# Patient Record
Sex: MALE | Race: BLACK OR AFRICAN AMERICAN | NOT HISPANIC OR LATINO | URBAN - METROPOLITAN AREA
[De-identification: names, ages, dates, MRNs, and addresses within clinical notes are randomized per-mention and may not be internally consistent; named-entity substitution may affect disease eponyms.]

---

## 2019-01-18 ENCOUNTER — EMERGENCY (EMERGENCY)
Facility: HOSPITAL | Age: 64
LOS: 1 days | Discharge: HOME | End: 2019-01-18
Admitting: EMERGENCY MEDICINE
Payer: MEDICAID

## 2019-01-18 VITALS
RESPIRATION RATE: 18 BRPM | SYSTOLIC BLOOD PRESSURE: 125 MMHG | TEMPERATURE: 97 F | DIASTOLIC BLOOD PRESSURE: 77 MMHG | OXYGEN SATURATION: 99 % | HEART RATE: 80 BPM

## 2019-01-18 VITALS
DIASTOLIC BLOOD PRESSURE: 85 MMHG | SYSTOLIC BLOOD PRESSURE: 144 MMHG | HEART RATE: 95 BPM | OXYGEN SATURATION: 100 % | RESPIRATION RATE: 16 BRPM | TEMPERATURE: 99 F

## 2019-01-18 LAB
APPEARANCE UR: CLEAR — SIGNIFICANT CHANGE UP
BILIRUB UR-MCNC: NEGATIVE — SIGNIFICANT CHANGE UP
BLOOD UR QL VISUAL: NEGATIVE — SIGNIFICANT CHANGE UP
COLOR SPEC: YELLOW — SIGNIFICANT CHANGE UP
GLUCOSE UR-MCNC: NEGATIVE — SIGNIFICANT CHANGE UP
KETONES UR-MCNC: NEGATIVE — SIGNIFICANT CHANGE UP
LEUKOCYTE ESTERASE UR-ACNC: NEGATIVE — SIGNIFICANT CHANGE UP
NITRITE UR-MCNC: NEGATIVE — SIGNIFICANT CHANGE UP
PH UR: 7.5 — SIGNIFICANT CHANGE UP (ref 5–8)
PROT UR-MCNC: 10 — SIGNIFICANT CHANGE UP
SP GR SPEC: 1.02 — SIGNIFICANT CHANGE UP (ref 1–1.04)
UROBILINOGEN FLD QL: NORMAL — SIGNIFICANT CHANGE UP

## 2019-01-18 PROCEDURE — 73030 X-RAY EXAM OF SHOULDER: CPT | Mod: 26,RT

## 2019-01-18 PROCEDURE — 73020 X-RAY EXAM OF SHOULDER: CPT | Mod: 26,RT

## 2019-01-18 PROCEDURE — 99283 EMERGENCY DEPT VISIT LOW MDM: CPT

## 2019-01-18 RX ORDER — IBUPROFEN 200 MG
600 TABLET ORAL ONCE
Qty: 0 | Refills: 0 | Status: COMPLETED | OUTPATIENT
Start: 2019-01-18 | End: 2019-01-18

## 2019-01-18 RX ADMIN — Medication 600 MILLIGRAM(S): at 10:42

## 2019-01-18 NOTE — ED PROVIDER NOTE - PHYSICAL EXAMINATION
R shoulder: FROM, NVI, sensate intact, mild ttp to anterior aspect of right shoulder, no erythema/ecchymosis/or swelling.    NEURO: EOMi, PERRLA, visual fields intact, tongue midline, negative romberg, strength 5/5 UE and LE bilaterally, normal gait, facial expressions intact, point to point intact, rapid alternating movements intact, sensate intact to UE and LE bilaterally. NVI

## 2019-01-18 NOTE — ED PROVIDER NOTE - NSFOLLOWUPINSTRUCTIONS_ED_ALL_ED_FT
Keep arm in sling  Follow up with Dr. Castillo from orthopedics  Continue Bactrim as your doctor prescribed  Return to ER for new or worsening symptoms

## 2019-01-18 NOTE — ED ADULT NURSE NOTE - OBJECTIVE STATEMENT
Received pt into spot #12 in intake area. Pt ambulatory no noted distress. C/o right shoulder pain and dysuria. Rates pain 8/10 pain scale. Evaluated by MANUEL Dia. Pending UA/urine Cx collection. Pt verbalized understanding.

## 2019-01-18 NOTE — ED PROVIDER NOTE - MEDICAL DECISION MAKING DETAILS
62 yo M here for dysuria x 3 weeks. well appearing. VSS. PLAN: urine, reassess 64 yo M here for dysuria x 3 weeks, tx by PMD with bactrim started yesterday. also c/o shoulder pain since trip and fall, denies additional injury or complaints. well appearing. VSS. PLAN: urine, reassess

## 2019-01-18 NOTE — ED PROVIDER NOTE - PROGRESS NOTE DETAILS
MANUEL aguilera: humeral head fx on xr, d/w ortho PA and recc CT to eval better. consider surgery vs. outpt fu. MANUEL aguilera: d/w ortho, sling and outpatient fu. pt agrees with plan, continue bactrim.

## 2019-01-18 NOTE — ED ADULT TRIAGE NOTE - CHIEF COMPLAINT QUOTE
Pt states that he was seen in [facility] 3 weeks ago, pt states that he fell and injured his right arm and had burning with urination, pt states that he is still having pain and dysuria states that he cannot sleep.  PMH prostate cancer had radiation last year.  Pt also states that he was seen by his PMD yesterday for unclear reason.  Pt is poor historian

## 2019-01-18 NOTE — ED PROVIDER NOTE - OBJECTIVE STATEMENT
62 yo M here for dysuria x 3 weeks. pt reports over the past 3 weeks he noted burning with urination. went to PMD yesterday but "didn't do anything". No meds taken. denies fever chills cp sob weakness HA dizziness back pain abdominal pain hematuria penile discharge testicular pain. 62 yo M here for dysuria x 3 weeks. pt reports over the past 3 weeks he noted burning with urination. went to PMD yesterday and rx'ed bactrim, took two pills so far. No other meds taken. Reports 2-3 weeks ago tripped and fell onto right shoulder. no head injury or LOC. went to OSH but "nothing was done". reports pain still in shoulder. denies fever chills cp sob weakness HA dizziness back pain abdominal pain hematuria penile discharge testicular pain.

## 2019-01-18 NOTE — ED ADULT NURSE NOTE - CHIEF COMPLAINT QUOTE
Pt states that he was seen in [facility] 3 weeks ago, pt states that he fell and injured his right arm and had burning with urination, pt states that he is still having pain and dysuria states that he cannot sleep.  PMH prostate cancer had radiation last year.  Pt also states that he was seen by his PMD yesterday for unclear reason.  Pt is poor historian

## 2019-01-18 NOTE — ED PROVIDER NOTE - CARE PROVIDER_API CALL
Earl Castillo), Orthopaedic Surgery  [address] [place] [address]  Phone: [phone]  Fax: [phone]

## 2019-01-19 LAB
BACTERIA UR CULT: SIGNIFICANT CHANGE UP
SPECIMEN SOURCE: SIGNIFICANT CHANGE UP

## 2019-09-19 ENCOUNTER — EMERGENCY (EMERGENCY)
Facility: HOSPITAL | Age: 64
LOS: 1 days | Discharge: HOME | End: 2019-09-19
Attending: EMERGENCY MEDICINE | Admitting: EMERGENCY MEDICINE
Payer: MEDICAID

## 2019-09-19 VITALS
DIASTOLIC BLOOD PRESSURE: 87 MMHG | SYSTOLIC BLOOD PRESSURE: 143 MMHG | RESPIRATION RATE: 16 BRPM | TEMPERATURE: 98 F | OXYGEN SATURATION: 99 % | HEART RATE: 83 BPM

## 2019-09-19 LAB
APPEARANCE UR: CLEAR — SIGNIFICANT CHANGE UP
BILIRUB UR-MCNC: NEGATIVE — SIGNIFICANT CHANGE UP
BLOOD UR QL VISUAL: NEGATIVE — SIGNIFICANT CHANGE UP
COLOR SPEC: YELLOW — SIGNIFICANT CHANGE UP
GLUCOSE UR-MCNC: NEGATIVE — SIGNIFICANT CHANGE UP
KETONES UR-MCNC: NEGATIVE — SIGNIFICANT CHANGE UP
LEUKOCYTE ESTERASE UR-ACNC: NEGATIVE — SIGNIFICANT CHANGE UP
NITRITE UR-MCNC: NEGATIVE — SIGNIFICANT CHANGE UP
PH UR: 7.5 — SIGNIFICANT CHANGE UP (ref 5–8)
PROT UR-MCNC: NEGATIVE — SIGNIFICANT CHANGE UP
SP GR SPEC: 1.02 — SIGNIFICANT CHANGE UP (ref 1–1.04)
UROBILINOGEN FLD QL: NORMAL — SIGNIFICANT CHANGE UP

## 2019-09-19 PROCEDURE — 99283 EMERGENCY DEPT VISIT LOW MDM: CPT

## 2019-09-19 RX ORDER — MOXIFLOXACIN HYDROCHLORIDE TABLETS, 400 MG 400 MG/1
1 TABLET, FILM COATED ORAL
Qty: 28 | Refills: 0
Start: 2019-09-19 | End: 2019-10-02

## 2019-09-19 NOTE — ED PROVIDER NOTE - PHYSICAL EXAMINATION
GENERAL: no acute distress, non-toxic appearing  HEAD: normocephalic, atraumatic  HEENT: normal conjunctiva, oral mucosa moist, neck supple  CARDIAC: regular rate and rhythm, normal S1 and S2,  no appreciable murmurs  PULM: clear to ascultation bilaterally, no crackles, rales, rhonchi, or wheezing  GI: abdomen nondistended, soft, nontender, no guarding or rebound tenderness  : no CVA tenderness, no suprapubic tenderness, enlarged prostate, boggy to palpation  NEURO: alert and oriented x 3, normal speech, PERRLA, EOMI, no focal motor or sensory deficits,   MSK: no visible deformities, no peripheral edema, calf tenderness/redness/swelling  SKIN: no visible rashes, dry, well-perfused  PSYCH: appropriate mood and affect

## 2019-09-19 NOTE — ED ADULT TRIAGE NOTE - CHIEF COMPLAINT QUOTE
c/o dysuria, urinary urgency and increased urinary frequency since last night. Pt states was treated by PCP for same symptoms 2 weeks ago with antibiotics. Hx BPH, HTN

## 2019-09-19 NOTE — ED PROVIDER NOTE - PMH
Benign prostatic hyperplasia, unspecified whether lower urinary tract symptoms present    Hypertension, unspecified type

## 2019-09-19 NOTE — ED PROVIDER NOTE - NS ED ROS FT
GENERAL: no fever, chills  HEENT: no cough, congestion, odynophagia, dysphagia  CARDIAC: no chest pain, palpitations, lightheadedness  PULM: no dyspnea, wheezing   GI: no abdominal pain, nausea, vomiting, diarrhea, constipation, melena, hematochezia  : (+) urinary dysuria, denies frequency, incontinence, hematuria  NEURO: no headache, motor weakness, sensory changes  MSK: no joint or muscle pain  SKIN: no rashes, hives  HEME: no active bleeding, bruising

## 2019-09-19 NOTE — ED PROVIDER NOTE - PATIENT PORTAL LINK FT
You can access the FollowMyHealth Patient Portal offered by [facility] by registering at the following website: [email or web address]. By joining [employer]’s FollowMyHealth portal, you will also be able to view your health information using other applications (apps) compatible with our system.

## 2019-09-19 NOTE — ED PROVIDER NOTE - CLINICAL SUMMARY MEDICAL DECISION MAKING FREE TEXT BOX
63y M with pmhx of BPH and HTN presents to the ED with 1 day history of pain on urination. He is currently following with urology who is managing his BPH. He does not endorse pain while sitting idle. differential includes, but not limited to: UTI, BPH, prostatitis  will U/A, urine culture   start antibiotics and instruct follow-up with urology

## 2019-09-19 NOTE — ED PROVIDER NOTE - OBJECTIVE STATEMENT
63y M with pmhx of BPH and HTN presents to the ED with pain on urination that started today. He has had a history of urination pain that started 2 weeks ago. He was seen by in a urology office and prescribed antibiotics which he finished on Tuesday 8/17/19. He states that his antibiotics helped with his symptoms and was asymptotic until today. He does not remember which antibiotic. He denies any dribbling, reduced urine flow, abdominal pain, N/V/D, headache, CP, or SOB.

## 2019-09-19 NOTE — ED PROVIDER NOTE - NSFOLLOWUPINSTRUCTIONS_ED_ALL_ED_FT
Take cipro 500mg tab twice a day for 2 weeks  Return for fever, vomiting, or any other symptoms in which you feel you require immediate attention Take cipro 500mg tab twice a day for 2 weeks  Return for fever, vomiting, or any other symptoms in which you feel you require immediate attention    Follow up with urologist as scheduled.

## 2019-09-19 NOTE — ED PROVIDER NOTE - ATTENDING CONTRIBUTION TO CARE
agree with medical student note  "63y M with pmhx of BPH and HTN presents to the ED with pain on urination that started today. He has had a history of urination pain that started 2 weeks ago. He was seen by in a urology office and prescribed antibiotics which he finished on Tuesday 8/17/19. He states that his antibiotics helped with his symptoms and was asymptotic until today. "    PE: well appearing; VSS; CTAB/L; s1 s2 no m/r/g abd soft/NT/ND rectal performed by myself chaperone Med student Li; boggy prostate no tenderness    given hx and improvement with abx possibly partially treated prostatitis; UA shows no evidence of infection; will rx 2 weeks of abx and f/u with urology

## 2019-10-01 ENCOUNTER — OUTPATIENT (OUTPATIENT)
Dept: OUTPATIENT SERVICES | Facility: HOSPITAL | Age: 64
LOS: 1 days | End: 2019-10-01
Payer: MEDICAID

## 2019-10-01 PROCEDURE — G9001: CPT

## 2019-10-11 DIAGNOSIS — Z71.89 OTHER SPECIFIED COUNSELING: ICD-10-CM

## 2019-10-11 PROBLEM — N40.0 BENIGN PROSTATIC HYPERPLASIA WITHOUT LOWER URINARY TRACT SYMPTOMS: Chronic | Status: ACTIVE | Noted: 2019-09-19

## 2019-10-11 PROBLEM — I10 ESSENTIAL (PRIMARY) HYPERTENSION: Chronic | Status: ACTIVE | Noted: 2019-09-19

## 2020-03-14 ENCOUNTER — EMERGENCY (EMERGENCY)
Facility: HOSPITAL | Age: 65
LOS: 1 days | Discharge: HOME | End: 2020-03-14
Admitting: EMERGENCY MEDICINE
Payer: MEDICAID

## 2020-03-14 VITALS
HEART RATE: 73 BPM | DIASTOLIC BLOOD PRESSURE: 93 MMHG | SYSTOLIC BLOOD PRESSURE: 133 MMHG | OXYGEN SATURATION: 100 % | TEMPERATURE: 98 F | RESPIRATION RATE: 16 BRPM

## 2020-03-14 LAB
APPEARANCE UR: CLEAR — SIGNIFICANT CHANGE UP
BILIRUB UR-MCNC: NEGATIVE — SIGNIFICANT CHANGE UP
BLOOD UR QL VISUAL: SIGNIFICANT CHANGE UP
COLOR SPEC: YELLOW — SIGNIFICANT CHANGE UP
D DIMER BLD IA.RAPID-MCNC: 217 NG/ML — SIGNIFICANT CHANGE UP
GLUCOSE UR-MCNC: NEGATIVE — SIGNIFICANT CHANGE UP
KETONES UR-MCNC: SIGNIFICANT CHANGE UP
LEUKOCYTE ESTERASE UR-ACNC: NEGATIVE — SIGNIFICANT CHANGE UP
NITRITE UR-MCNC: NEGATIVE — SIGNIFICANT CHANGE UP
PH UR: 6.5 — SIGNIFICANT CHANGE UP (ref 5–8)
PROT UR-MCNC: 20 — SIGNIFICANT CHANGE UP
RBC CASTS # UR COMP ASSIST: SIGNIFICANT CHANGE UP (ref 0–?)
SP GR SPEC: 1.02 — SIGNIFICANT CHANGE UP (ref 1–1.04)
UROBILINOGEN FLD QL: NORMAL — SIGNIFICANT CHANGE UP
WBC UR QL: SIGNIFICANT CHANGE UP (ref 0–?)

## 2020-03-14 PROCEDURE — 99284 EMERGENCY DEPT VISIT MOD MDM: CPT

## 2020-03-14 PROCEDURE — 71046 X-RAY EXAM CHEST 2 VIEWS: CPT | Mod: 26

## 2020-03-14 RX ORDER — LIDOCAINE 4 G/100G
1 CREAM TOPICAL ONCE
Refills: 0 | Status: COMPLETED | OUTPATIENT
Start: 2020-03-14 | End: 2020-03-14

## 2020-03-14 RX ORDER — ACETAMINOPHEN 500 MG
975 TABLET ORAL ONCE
Refills: 0 | Status: COMPLETED | OUTPATIENT
Start: 2020-03-14 | End: 2020-03-14

## 2020-03-14 RX ADMIN — LIDOCAINE 1 PATCH: 4 CREAM TOPICAL at 20:44

## 2020-03-14 RX ADMIN — Medication 975 MILLIGRAM(S): at 20:44

## 2020-03-14 NOTE — ED PROVIDER NOTE - CARE PLAN
Principal Discharge DX:	Atelectasis  Secondary Diagnosis:	Rib pain  Secondary Diagnosis:	Urinary urgency

## 2020-03-14 NOTE — ED ADULT TRIAGE NOTE - CHIEF COMPLAINT QUOTE
Pt c/o left side pain x1 week.  Denies hematuria or fever.  Says he feels pain when he urinates after holding it for a while.  S/P R shoulder pain 2/19

## 2020-03-14 NOTE — ED PROVIDER NOTE - CLINICAL SUMMARY MEDICAL DECISION MAKING FREE TEXT BOX
65 Y/O M Prostate CA S/P Radiation, HTN C/O urinary urgency for the past week also with pain over his L ribs X 1 week. Pt denies fever chills or nightsweats. Plan is Xray to R/O consolidation or other pulmonary abnormality and D-Dimer to R.O PE. UA also sent to eval for infectious etiology of frequent urination. Pt denies other sx or complaints.

## 2020-03-14 NOTE — ED PROVIDER NOTE - PATIENT PORTAL LINK FT
You can access the FollowMyHealth Patient Portal offered by [facility] by registering at the following website: [email or web address]. By joining [employer]’s FollowMyHealth portal, you will also be able to view your health information using other applications (apps) compatible with our system.

## 2020-03-14 NOTE — ED PROVIDER NOTE - MUSCULOSKELETAL MINIMAL EXAM
Tenderness to palpation of L ribs, no crepitus or palpable armando deformity, no ecchymosis, dermatitis or skin change

## 2020-03-14 NOTE — ED ADULT NURSE NOTE - NSIMPLEMENTINTERV_GEN_ALL_ED
Implemented All Universal Safety Interventions:  [place] to call system. Call bell, personal items and telephone within reach. Instruct patient to call for assistance. Room bathroom lighting operational. Non-slip footwear when patient is off stretcher. Physically safe environment: no spills, clutter or unnecessary equipment. Stretcher in lowest position, wheels locked, appropriate side rails in place.

## 2020-03-14 NOTE — ED ADULT NURSE REASSESSMENT NOTE - NS ED NURSE REASSESS COMMENT FT1
Pt received from intake alert and oriented x 4 c/o of left side rib pian 5/10 worse with movement pt was medicated as ordered. Respirations are even unlabored skin dry intact pt is awaiting lab results at this time.

## 2020-03-14 NOTE — ED PROVIDER NOTE - NSFOLLOWUPINSTRUCTIONS_ED_ALL_ED_FT
Follow up with your primary doctor and see a pulmonologist and a urologist. If you do not have one call  to schedule an appointment. Use the spirometer (breathing machine) given to you in the ER and take Motrin and or Tylenol as needed for pain. Advance activity as tolerated.  Continue all previously prescribed medications as directed.  Follow up with your primary care physician in 48-72 hours- bring copies of your results.  Return to the ER for worsening or persistent symptoms, and/or ANY NEW OR CONCERNING SYMPTOMS. THIS INCLUDES BUT IS NOT LIMITED TO: CHEST PAIN, SHORTNESS OF BREATH, FEVER, CHILLS, NIGHTSWEATS OR FOR ANY OTHER SYMPTOMS THAT CONCERN YOU. If you have issues obtaining follow up, please call: [phone] to obtain a doctor or specialist who takes your insurance in your area.  You may call [phone] to make an appointment with the internal medicine clinic.

## 2020-03-14 NOTE — ED PROVIDER NOTE - OBJECTIVE STATEMENT
65 Y/O M Prostate CA S/P Radiation, HTN C/O urinary urgency for the past week also with pain over his L ribs X 1 week. Pt denies fever chills or nightsweats. States that he will have the sudden urge to urinate and states if he does not it becomes painful. Pt denies urinary retention or incontinence. Pt states he has had pain on his L ribs worse with deep breathing or pressure. Denies recent trauma or specific injury. States it improves with motrin but states he grew concerned as it has been present for one week, denies associated cough. Pt denies any other sx or acute complaints.

## 2020-03-16 LAB
CULTURE RESULTS: SIGNIFICANT CHANGE UP
SPECIMEN SOURCE: SIGNIFICANT CHANGE UP

## 2025-06-08 NOTE — ED PROVIDER NOTE - NS ED MD DISPO DISCHARGE
Resolved, acute renal failure with creatinine 4.98 in the setting of robotic prostatectomy on 5/28 secondary to prostate cancer.  Cr normalizedc    Plan:  - Hold IVF  - Appreciate urology involvement  -Trend renal function  -Trend electrolytes  -Continue to monitor    Home